# Patient Record
Sex: FEMALE | Race: WHITE | URBAN - METROPOLITAN AREA
[De-identification: names, ages, dates, MRNs, and addresses within clinical notes are randomized per-mention and may not be internally consistent; named-entity substitution may affect disease eponyms.]

---

## 2017-01-19 ENCOUNTER — OFFICE VISIT (OUTPATIENT)
Dept: FAMILY MEDICINE CLINIC | Facility: CLINIC | Age: 37
End: 2017-01-19

## 2017-01-19 VITALS — SYSTOLIC BLOOD PRESSURE: 120 MMHG | DIASTOLIC BLOOD PRESSURE: 80 MMHG | WEIGHT: 171 LBS

## 2017-01-19 DIAGNOSIS — Z97.5 IUD (INTRAUTERINE DEVICE) IN PLACE: Primary | ICD-10-CM

## 2017-01-20 NOTE — PROGRESS NOTES
[address] [facility] Office Note  Chief Complaint:   Patient presents with:  IUD: check      HPI:   This is a 39year old female coming in for    No results found for this or any previous visit.     No past medical history on file encounter    Health Maintenance:  Annual Depression Screen due on 09/13/1980  Annual Physical due on 09/13/1982  Pap Smear,3 Years due on 09/13/2011  Influenza Vaccine(1) due on 09/01/2016    Patient/Caregiver Education: Patient/Caregiver Education: There

## (undated) NOTE — MR AVS SNAPSHOT
[address] at [facility]  [address]  [facility], [address] [facility]               Thank you for choosing us for your health care visit with Brando Gilbert MD.  We are glad to serve you and happy to provide Tips for making healthy food choices  -   Enjoy your food, but eat less. Fully enjoy your food when eating. Don’t eat while distracted and slow down. Avoid over sized portions. Don’t eat while when you’re bored.      EAT THESE FOODS MORE OFTEN: EAT T